# Patient Record
Sex: FEMALE | Race: WHITE | NOT HISPANIC OR LATINO | Employment: FULL TIME | URBAN - METROPOLITAN AREA
[De-identification: names, ages, dates, MRNs, and addresses within clinical notes are randomized per-mention and may not be internally consistent; named-entity substitution may affect disease eponyms.]

---

## 2017-03-20 ENCOUNTER — OFFICE VISIT - HEALTHEAST (OUTPATIENT)
Dept: FAMILY MEDICINE | Facility: CLINIC | Age: 27
End: 2017-03-20

## 2017-03-20 DIAGNOSIS — Z20.818 STREP THROAT EXPOSURE: ICD-10-CM

## 2017-03-20 DIAGNOSIS — Z20.828 EXPOSURE TO INFLUENZA: ICD-10-CM

## 2017-03-20 DIAGNOSIS — R68.89 INFLUENZA-LIKE SYMPTOMS: ICD-10-CM

## 2017-03-20 DIAGNOSIS — H61.22 LEFT EAR IMPACTED CERUMEN: ICD-10-CM

## 2017-03-20 DIAGNOSIS — M79.10 MYALGIA: ICD-10-CM

## 2017-03-20 RX ORDER — VALACYCLOVIR HYDROCHLORIDE 500 MG/1
TABLET, FILM COATED ORAL
Refills: 2 | Status: SHIPPED | COMMUNITY
Start: 2017-03-08

## 2017-05-02 ENCOUNTER — HOSPITAL ENCOUNTER (OUTPATIENT)
Dept: OBGYN | Facility: HOSPITAL | Age: 27
Discharge: HOME | End: 2017-05-02
Attending: OBSTETRICS & GYNECOLOGY | Admitting: OBSTETRICS & GYNECOLOGY

## 2017-05-02 ASSESSMENT — MIFFLIN-ST. JEOR: SCORE: 1457.04

## 2017-06-02 ENCOUNTER — COMMUNICATION - HEALTHEAST (OUTPATIENT)
Dept: ADMINISTRATIVE | Facility: OTHER | Age: 27
End: 2017-06-02

## 2017-06-16 ENCOUNTER — HOSPITAL ENCOUNTER (OUTPATIENT)
Dept: MEDSURG UNIT | Facility: CLINIC | Age: 27
Discharge: HOME | End: 2017-06-16
Attending: OBSTETRICS & GYNECOLOGY | Admitting: OBSTETRICS & GYNECOLOGY

## 2017-06-16 ENCOUNTER — HOSPITAL ENCOUNTER (OUTPATIENT)
Dept: OBGYN | Facility: CLINIC | Age: 27
Discharge: HOME | End: 2017-06-17
Attending: OBSTETRICS & GYNECOLOGY | Admitting: OBSTETRICS & GYNECOLOGY

## 2017-06-16 ASSESSMENT — MIFFLIN-ST. JEOR: SCORE: 1457.04

## 2017-06-29 ENCOUNTER — HOME CARE/HOSPICE - HEALTHEAST (OUTPATIENT)
Dept: HOME HEALTH SERVICES | Facility: HOME HEALTH | Age: 27
End: 2017-06-29

## 2017-07-02 ENCOUNTER — HOME CARE/HOSPICE - HEALTHEAST (OUTPATIENT)
Dept: HOME HEALTH SERVICES | Facility: HOME HEALTH | Age: 27
End: 2017-07-02

## 2017-07-31 ENCOUNTER — OFFICE VISIT - HEALTHEAST (OUTPATIENT)
Dept: FAMILY MEDICINE | Facility: CLINIC | Age: 27
End: 2017-07-31

## 2017-07-31 DIAGNOSIS — T63.301A SPIDER BITE: ICD-10-CM

## 2017-07-31 ASSESSMENT — MIFFLIN-ST. JEOR: SCORE: 1373.13

## 2017-10-03 ENCOUNTER — OFFICE VISIT - HEALTHEAST (OUTPATIENT)
Dept: FAMILY MEDICINE | Facility: CLINIC | Age: 27
End: 2017-10-03

## 2017-10-03 DIAGNOSIS — J01.90 ACUTE SINUSITIS: ICD-10-CM

## 2018-01-31 ENCOUNTER — COMMUNICATION - HEALTHEAST (OUTPATIENT)
Dept: SCHEDULING | Facility: CLINIC | Age: 28
End: 2018-01-31

## 2018-02-01 ENCOUNTER — OFFICE VISIT - HEALTHEAST (OUTPATIENT)
Dept: FAMILY MEDICINE | Facility: CLINIC | Age: 28
End: 2018-02-01

## 2018-02-01 DIAGNOSIS — H61.22 IMPACTED CERUMEN OF LEFT EAR: ICD-10-CM

## 2018-02-01 DIAGNOSIS — R68.89 FLU-LIKE SYMPTOMS: ICD-10-CM

## 2018-02-01 DIAGNOSIS — H66.90 OTITIS MEDIA: ICD-10-CM

## 2018-02-01 LAB
DEPRECATED S PYO AG THROAT QL EIA: NORMAL
FLUAV AG SPEC QL IA: NORMAL
FLUBV AG SPEC QL IA: NORMAL

## 2018-02-01 ASSESSMENT — MIFFLIN-ST. JEOR: SCORE: 1393.54

## 2018-02-02 ENCOUNTER — COMMUNICATION - HEALTHEAST (OUTPATIENT)
Dept: FAMILY MEDICINE | Facility: CLINIC | Age: 28
End: 2018-02-02

## 2018-02-02 LAB — GROUP A STREP BY PCR: NORMAL

## 2018-02-07 ENCOUNTER — OFFICE VISIT - HEALTHEAST (OUTPATIENT)
Dept: CARDIOLOGY | Facility: CLINIC | Age: 28
End: 2018-02-07

## 2018-02-07 DIAGNOSIS — J90 PLEURAL EFFUSION, BILATERAL: ICD-10-CM

## 2018-02-07 DIAGNOSIS — R06.00 DYSPNEA: ICD-10-CM

## 2018-02-07 ASSESSMENT — MIFFLIN-ST. JEOR: SCORE: 1384.47

## 2018-02-08 ENCOUNTER — OFFICE VISIT - HEALTHEAST (OUTPATIENT)
Dept: FAMILY MEDICINE | Facility: CLINIC | Age: 28
End: 2018-02-08

## 2018-02-08 DIAGNOSIS — J98.4 PNEUMONITIS: ICD-10-CM

## 2018-02-08 ASSESSMENT — MIFFLIN-ST. JEOR: SCORE: 1379.93

## 2018-02-15 ENCOUNTER — HOSPITAL ENCOUNTER (OUTPATIENT)
Dept: CARDIOLOGY | Facility: CLINIC | Age: 28
Discharge: HOME | End: 2018-02-15
Attending: INTERNAL MEDICINE

## 2018-02-15 DIAGNOSIS — J90 PLEURAL EFFUSION, BILATERAL: ICD-10-CM

## 2018-02-15 DIAGNOSIS — R06.00 DYSPNEA: ICD-10-CM

## 2018-02-15 LAB
AORTIC ROOT: 2.4 CM
BSA FOR ECHO PROCEDURE: 1.77 M2
CV BLOOD PRESSURE: NORMAL MMHG
CV ECHO HEIGHT: 62 IN
CV ECHO WEIGHT: 158 LBS
DOP CALC LVOT AREA: 2.83 CM2
DOP CALC LVOT DIAMETER: 1.9 CM
DOP CALC LVOT PEAK VEL: 70.7 CM/S
DOP CALC LVOT STROKE VOLUME: 48.5 CM3
DOP CALCLVOT PEAK VEL VTI: 17.1 CM
EJECTION FRACTION: 59 % (ref 55–75)
FRACTIONAL SHORTENING: 29.4 % (ref 28–44)
INTERVENTRICULAR SEPTUM IN END DIASTOLE: 0.9 CM (ref 0.6–0.9)
IVS/PW RATIO: 1
LA AREA 1: 10.9 CM2
LA AREA 2: 8.89 CM2
LEFT ATRIUM LENGTH: 3.77 CM
LEFT ATRIUM SIZE: 2.9 CM
LEFT ATRIUM TO AORTIC ROOT RATIO: 1.21 NO UNITS
LEFT ATRIUM VOLUME INDEX: 12.3 ML/M2
LEFT ATRIUM VOLUME: 21.8 ML
LEFT VENTRICLE DIASTOLIC VOLUME INDEX: 36.2 CM3/M2 (ref 34–74)
LEFT VENTRICLE DIASTOLIC VOLUME: 64 CM3 (ref 46–106)
LEFT VENTRICLE MASS INDEX: 58.1 G/M2
LEFT VENTRICLE SYSTOLIC VOLUME INDEX: 14.7 CM3/M2 (ref 11–31)
LEFT VENTRICLE SYSTOLIC VOLUME: 26 CM3 (ref 14–42)
LEFT VENTRICULAR INTERNAL DIMENSION IN DIASTOLE: 3.84 CM (ref 3.8–5.2)
LEFT VENTRICULAR INTERNAL DIMENSION IN SYSTOLE: 2.71 CM (ref 2.2–3.5)
LEFT VENTRICULAR MASS: 102.8 G
LEFT VENTRICULAR OUTFLOW TRACT MEAN GRADIENT: 1 MMHG
LEFT VENTRICULAR OUTFLOW TRACT MEAN VELOCITY: 51.1 CM/S
LEFT VENTRICULAR OUTFLOW TRACT PEAK GRADIENT: 2 MMHG
LEFT VENTRICULAR POSTERIOR WALL IN END DIASTOLE: 0.9 CM (ref 0.6–0.9)
LV STROKE VOLUME INDEX: 27.4 ML/M2
MITRAL VALVE E/A RATIO: 1.6
MV DECELERATION TIME: 296 MS
MV E'TISSUE VEL-LAT: 22.6 CM/S
MV LATERAL E/E' RATIO: 3.9
MV PEAK A VELOCITY: 53.8 CM/S
MV PEAK E VELOCITY: 87.9 CM/S
NUC REST DIASTOLIC VOLUME INDEX: 2528 LBS
NUC REST SYSTOLIC VOLUME INDEX: 62 IN
TRICUSPID REGURGITATION PEAK PRESSURE GRADIENT: 20.6 MMHG
TRICUSPID VALVE ANULAR PLANE SYSTOLIC EXCURSION: 2.1 CM
TRICUSPID VALVE PEAK REGURGITANT VELOCITY: 227 CM/S

## 2018-02-15 ASSESSMENT — MIFFLIN-ST. JEOR: SCORE: 1379.93

## 2018-07-03 LAB
ANTIBODY_EXT (HISTORICAL CONVERSION): NEGATIVE
HBSAG_EXT (HISTORICAL CONVERSION): NORMAL
HGB_EXT (HISTORICAL CONVERSION): 13.7
HIV_EXT: NORMAL
PLT_EXT - HISTORICAL: 304
RPR - HISTORICAL: NORMAL
RUBELLA_EXT (HISTORICAL CONVERSION): NORMAL

## 2018-10-25 ENCOUNTER — COMMUNICATION - HEALTHEAST (OUTPATIENT)
Dept: TELEHEALTH | Facility: CLINIC | Age: 28
End: 2018-10-25

## 2018-10-25 ENCOUNTER — COMMUNICATION - HEALTHEAST (OUTPATIENT)
Dept: HEALTH INFORMATION MANAGEMENT | Facility: CLINIC | Age: 28
End: 2018-10-25

## 2019-01-02 LAB — GBS_EXT (HISTORICAL CONVERSION): NEGATIVE

## 2019-01-17 ENCOUNTER — HOSPITAL ENCOUNTER (OUTPATIENT)
Dept: OBGYN | Facility: CLINIC | Age: 29
Discharge: HOME | End: 2019-01-17
Attending: OBSTETRICS & GYNECOLOGY | Admitting: OBSTETRICS & GYNECOLOGY

## 2019-01-17 LAB — T PALLIDUM AB SER QL: NEGATIVE

## 2019-01-17 ASSESSMENT — MIFFLIN-ST. JEOR: SCORE: 1529.62

## 2019-01-19 ENCOUNTER — HOSPITAL ENCOUNTER (OUTPATIENT)
Dept: MEDSURG UNIT | Facility: CLINIC | Age: 29
Discharge: HOME | End: 2019-01-19
Attending: OBSTETRICS & GYNECOLOGY | Admitting: OBSTETRICS & GYNECOLOGY

## 2019-01-19 ASSESSMENT — MIFFLIN-ST. JEOR: SCORE: 1529.62

## 2019-01-21 ENCOUNTER — HOME CARE/HOSPICE - HEALTHEAST (OUTPATIENT)
Dept: HOME HEALTH SERVICES | Facility: HOME HEALTH | Age: 29
End: 2019-01-21

## 2019-10-17 ENCOUNTER — OFFICE VISIT - HEALTHEAST (OUTPATIENT)
Dept: FAMILY MEDICINE | Facility: CLINIC | Age: 29
End: 2019-10-17

## 2019-10-17 DIAGNOSIS — D22.9 MULTIPLE ATYPICAL SKIN MOLES: ICD-10-CM

## 2019-10-17 DIAGNOSIS — M54.2 NECK PAIN: ICD-10-CM

## 2019-10-17 ASSESSMENT — MIFFLIN-ST. JEOR: SCORE: 1428.42

## 2019-10-17 ASSESSMENT — PATIENT HEALTH QUESTIONNAIRE - PHQ9: SUM OF ALL RESPONSES TO PHQ QUESTIONS 1-9: 0

## 2021-05-26 ENCOUNTER — RECORDS - HEALTHEAST (OUTPATIENT)
Dept: ADMINISTRATIVE | Facility: CLINIC | Age: 31
End: 2021-05-26

## 2021-05-26 ASSESSMENT — PATIENT HEALTH QUESTIONNAIRE - PHQ9: SUM OF ALL RESPONSES TO PHQ QUESTIONS 1-9: 0

## 2021-05-27 ENCOUNTER — RECORDS - HEALTHEAST (OUTPATIENT)
Dept: ADMINISTRATIVE | Facility: CLINIC | Age: 31
End: 2021-05-27

## 2021-05-29 ENCOUNTER — RECORDS - HEALTHEAST (OUTPATIENT)
Dept: ADMINISTRATIVE | Facility: CLINIC | Age: 31
End: 2021-05-29

## 2021-05-30 VITALS — BODY MASS INDEX: 32.2 KG/M2 | WEIGHT: 175 LBS | HEIGHT: 62 IN

## 2021-05-30 VITALS — BODY MASS INDEX: 30.42 KG/M2 | WEIGHT: 166.3 LBS

## 2021-05-31 ENCOUNTER — RECORDS - HEALTHEAST (OUTPATIENT)
Dept: ADMINISTRATIVE | Facility: CLINIC | Age: 31
End: 2021-05-31

## 2021-05-31 VITALS — BODY MASS INDEX: 32.2 KG/M2 | HEIGHT: 62 IN | WEIGHT: 175 LBS

## 2021-05-31 VITALS — WEIGHT: 156.5 LBS | BODY MASS INDEX: 28.8 KG/M2 | HEIGHT: 62 IN

## 2021-05-31 VITALS — HEIGHT: 62 IN | WEIGHT: 161 LBS | BODY MASS INDEX: 29.63 KG/M2

## 2021-06-01 ENCOUNTER — RECORDS - HEALTHEAST (OUTPATIENT)
Dept: ADMINISTRATIVE | Facility: CLINIC | Age: 31
End: 2021-06-01

## 2021-06-01 VITALS — WEIGHT: 158 LBS | BODY MASS INDEX: 29.08 KG/M2 | HEIGHT: 62 IN

## 2021-06-01 VITALS — WEIGHT: 158 LBS | HEIGHT: 62 IN | BODY MASS INDEX: 29.08 KG/M2

## 2021-06-01 VITALS — BODY MASS INDEX: 29.26 KG/M2 | WEIGHT: 159 LBS | HEIGHT: 62 IN

## 2021-06-02 ENCOUNTER — RECORDS - HEALTHEAST (OUTPATIENT)
Dept: ADMINISTRATIVE | Facility: CLINIC | Age: 31
End: 2021-06-02

## 2021-06-02 VITALS — HEIGHT: 62 IN | WEIGHT: 191 LBS | BODY MASS INDEX: 35.15 KG/M2

## 2021-06-02 VITALS — BODY MASS INDEX: 35.15 KG/M2 | WEIGHT: 191 LBS | HEIGHT: 62 IN

## 2021-06-02 NOTE — PROGRESS NOTES
OV   10/17/2019  Assessment & Plan:         1. Neck pain  cyclobenzaprine (FLEXERIL) 10 MG tablet   2. Multiple atypical skin moles           We reviewed the potential etiologies for her neck pain, and options for treatment. I will send a new Rx for cyclobenzaprine to be taken at bedtime as needed, and she can take otc tylenol or advil as needed for comfort. We discussed activity as directed and use of heat and/or ice for comfort. She will call or return to clinic with any ongoing or worsening symptoms. We reviewed indications for re-evaluation and she will call or return to clinic with any additional problems or concerns. I reassured her that the moles appear benign and we reviewed indications for re-evaluation. We discussed referral to dermatology for a full body skin check and she will call if she would like to proceed.       Subjective:               Silva Guerrero is a 29 y.o. female who presents for evaluation of neck pain. Event that precipitated these symptoms: none known. Onset of symptoms was several months ago, and have been intermittent since that time. Curren tsymptoms are pain in posterior neck (aching and burning in character; moderate/10 in severity) and stiffness in mornings. Patient denies paresthesias and weakness in arms/hands. Patient has had no prior neck problems. Previous treatments: none.         She also reports moles that she is concerned about. One on her back keeps crusting over. She denies itching or bleeding and has not noted any significant changes in any of the lesions.     The following portions of the patient's history were reviewed and updated as appropriate: allergies, current medications, past family history, past medical history, past social history, past surgical history and problem list.    Review of Systems  A 12 point comprehensive review of systems was negative except as noted.      Objective:      /84 (Patient Position: Sitting, Cuff Size: Adult Regular)   Pulse  "(!) 117   Ht 5' 3\" (1.6 m)   Wt 165 lb 3 oz (74.9 kg)   SpO2 98%   BMI 29.26 kg/m    GEN: Alert and oriented, NAD, well nourished  SKIN:  Normal skin turgor. Scattered waxy lesions on back and trunk.   HEENT: NC/AT, moist mucous membranes, no rhinorrhea.    NECK: Normal.  No adenopathy or thyromegaly.  CV: Regular rate and rhythm, no murmurs.   LUNGS: Clear to auscultation bilaterally.    ABDOMEN: Soft, non-tender, non-distended, no masses   BACK: Normal  EXTREMITY: No edema, cyanosis  NEURO: Grossly normal. Normal strength and   "

## 2021-06-03 VITALS
HEIGHT: 63 IN | BODY MASS INDEX: 29.27 KG/M2 | OXYGEN SATURATION: 98 % | WEIGHT: 165.19 LBS | DIASTOLIC BLOOD PRESSURE: 84 MMHG | SYSTOLIC BLOOD PRESSURE: 122 MMHG | HEART RATE: 117 BPM

## 2021-06-11 NOTE — PROGRESS NOTES
Dr Sebastian updated by phone of pt status and unchanged cervex.  D/C orders received.  Reviewed with pt stretching and positions for encouraging baby to align better for labor and d/c instructions reviewed.  Pt to home ambulatory.

## 2021-06-11 NOTE — PROGRESS NOTES
Notified Dr. Sebastian that pt slept after morphine and vistaril, SVE unchanged, will d/c pt home after reactive NST. Reviewed labor precautions and any day now brochure.

## 2021-06-11 NOTE — PROGRESS NOTES
Pt here with hx of nuno all night pt more painful depending on position.  Was 2m cm in clinic this week.  Denies LOF.

## 2021-06-12 NOTE — PROGRESS NOTES
PROGRESS NOTE       SUBJECTIVE:  Silva Guerrero is a 26 y.o. female   Chief Complaint   Patient presents with     Insect Bite     ? spider bite - (L) breast - is breast feeding     Patient has an insect bite on her left breast.  It happened 2 days ago and the redness surrounding it appears to be increasing.  It is also tender to touch but there is been no drainage.  Initially it was itchy.  She did not see the insect that bit her.  She is currently breast-feeding her 1-month-old son.    Patient Active Problem List   Diagnosis     Herpes Simplex Type II     Congenital Unilateral Dislocation Of Hip     Abnormal Weight Gain     Torticollis     Depression With Anxiety     Amenorrhea     Neck Pain     Spontaneous      Recurrent Pregnancy Loss (Non-gravid)     Irregular Length Of Menstrual Periods     Vaginal delivery       Current Outpatient Prescriptions   Medication Sig Dispense Refill     PRENATAL VIT W-CA,FE,FA,<1 MG, (PRENATAL VITAMIN ORAL) Take by mouth.       valACYclovir (VALTREX) 500 MG tablet   2     cephalexin (KEFLEX) 500 MG capsule Take 1 capsule (500 mg total) by mouth 3 (three) times a day for 7 days. 21 capsule 0     docusate sodium (COLACE) 100 MG capsule Take 1 capsule (100 mg total) by mouth 2 (two) times a day. 60 capsule 0     FOLIC ACID ORAL Take by mouth.       No current facility-administered medications for this visit.        History   Smoking Status     Former Smoker     Packs/day: 0.25     Quit date: 2015   Smokeless Tobacco     Never Used       OBJECTIVE:       Vitals:    17 1633   BP: 92/64   Pulse: 64     Weight: 156 lb 8 oz (71 kg)  Wt Readings from Last 3 Encounters:   17 156 lb 8 oz (71 kg)   17 184 lb (83.5 kg)   17 175 lb (79.4 kg)     Body mass index is 28.62 kg/(m^2).        Physical Exam:  GENERAL APPEARANCE: A&A, NAD, well hydrated, well nourished  SKIN: Patient has an area of induration 1 cm in diameter that is erythematous in the upper outer  portion of the  left breast.  There is surrounding erythema that measures 4 cm in diameter.  There is no open drainage.  PSYCHIATRIC:  Mood appropriate, memory intact        ASSESSMENT/PLAN:     1. Spider bite  Since the area of redness has increased and there is tenderness to palpation, I recommended treating with cephalexin and warm packs.  Patient agrees with the plan and I explained that this is a medication that we use commonly for mastitis.  She should recheck if fails to improve.  - cephalexin (KEFLEX) 500 MG capsule; Take 1 capsule (500 mg total) by mouth 3 (three) times a day for 7 days.  Dispense: 21 capsule; Refill: 0    The visit lasted a total of 15 minutes face to face with the patient.  Over 50% of the time spent counseling and educating the patient about all of the above.      Zahraa Baugh MD

## 2021-06-13 NOTE — PROGRESS NOTES
Assessment:   The encounter diagnosis was Acute sinusitis.     Plan:   No medications were ordered this encounter    Patient Instructions     The symptoms you describe suggest a viral cause, which is much more common than a bacterial cause. We know that most sinus infections are viral and it will likely clear within about 7-10 days. If it is not improving at that time, it is more likely bacterial; we do not have a test to clearly tell the difference. Antibiotics will treat bacterial infections, but have no effect on viral infections. Bacterial infections generally are more severe, including symptoms such as pus, fever over 101degrees F, or rapidly worsening.    Push fluids, get extra rest  Recommend Tylenol or Ibuprofen for headaches  Hot steamy showers or warm packs over the sinuses to lessen the pressure and congestion  May take an antihistamine such as claritin, zyrtec or allegra if your nose is quite runny  Follow up in clinic if symptoms are not improving as expected or if worsening in any way.         Return for further follow up if needed. Call [phone]) or [facility]    Subjective:   Silva Guerrero is a 27 y.o. female who submitted an eVisit request for evaluation of her Sinus Problem.  See the questionnaire and message section of encounter report for information related to history of present illness and review of systems.    The following portions of the patient's history were reviewed and updated as appropriate:  She  does not have any pertinent problems on file.  She is allergic to latex..     Objective:   No exam performed today, patient submitted as eVisit.

## 2021-06-15 NOTE — PROGRESS NOTES
1. Flu-like symptoms  Rapid Strep A Screen-Throat    Influenza A/B Rapid Test    Group A Strep, RNA Direct Detection, Throat   2. Impacted cerumen of left ear  Ear Cerumen Removal-Clinic   3. Otitis media  amoxicillin (AMOXIL) 875 MG tablet     Med list reconciled    ASSESSMENT/PLAN:     The following high BMI interventions were performed this visit: encouragement to exercise and weight monitoring    1. Flu-like symptoms    - Rapid Strep A Screen-Throat  - Influenza A/B Rapid Test    2. Impacted cerumen of left ear    - Ear Cerumen Removal-Clinic    3.  Acute otitis media, left ear    -Amoxicillin 875 mg twice daily for 10 days    Return to clinic if symptoms persist or become severe despite antibiotic therapy.       The visit lasted a total of  minutes face to face with the patient.  Over 50% of the time spent counseling and educating the patient about .      Laurita Parikh NP          SUBJECTIVE:  Silva Guerrero is a 27 y.o. female who presents with 2 weeks of sore throat, chest congestion, back pain, runny nose and mild shortness of breath that is now constant.  She describes her generalized discomfort as an achy sensation.  Aggravating factors: Bending over and being active.  Relieving factors: She is very limited on over-the-counter use of medications due to nursing her .  She has tried some elderberry for her symptoms.  She is rating her throat and chest congestion discomfort a 6 out of 10 today.  She has been exposed to illness as her 7-year-old has recently had a cough and upper respiratory symptoms.  She has not traveled recently, she has no history of asthma and she does not smoke cigarettes.  Did not receive her influenza vaccination this year.  Today, her vital signs are stable, she has no fever.  Ear wash performed in clinic today due to left cerumen impaction.  Rapid strep and influenza testing performed.   Chief Complaint   Patient presents with     Illness     cough, chest tightness,  irritated throat, HA         Patient Active Problem List   Diagnosis     Herpes Simplex Type II     Congenital Unilateral Dislocation Of Hip     Abnormal Weight Gain     Torticollis     Depression With Anxiety     Amenorrhea     Neck Pain     Spontaneous      Recurrent Pregnancy Loss (Non-gravid)     Irregular Length Of Menstrual Periods     Vaginal delivery       Current Outpatient Prescriptions   Medication Sig Dispense Refill     FOLIC ACID ORAL Take by mouth.       PRENATAL VIT W-CA,FE,FA,<1 MG, (PRENATAL VITAMIN ORAL) Take by mouth.       valACYclovir (VALTREX) 500 MG tablet   2     amoxicillin (AMOXIL) 875 MG tablet Take 1 tablet (875 mg total) by mouth 2 (two) times a day for 10 days. 20 tablet 0     No current facility-administered medications for this visit.        History   Smoking Status     Former Smoker     Packs/day: 0.25     Quit date: 2015   Smokeless Tobacco     Never Used       REVIEW OF SYSTEMS: Denies swollen glands/abdominal pain/changes in bowel habits/urinary symptoms/rash.      TOBACCO USE:  History   Smoking Status     Former Smoker     Packs/day: 0.25     Quit date: 2015   Smokeless Tobacco     Never Used     Social History     Social History     Marital status:      Spouse name: N/A     Number of children: 2     Years of education: N/A     Occupational History     [employer]       Social History Main Topics     Smoking status: Former Smoker     Packs/day: 0.25     Quit date: 2015     Smokeless tobacco: Never Used     Alcohol use Yes      Comment: Rare social use; none with pregnancy     Drug use: No     Sexual activity: Yes     Partners: Male     Birth control/ protection: None     Other Topics Concern     Not on file     Social History Narrative    Daughter Rema (06), son Leonid Moore (10/7/10) .         OBJECTIVE:            Vitals:    18 1450   BP: 110/70   Pulse: 86   Resp: 16   Temp: 98.2  F (36.8  C)   SpO2: 98%     Weight: 161 lb (73  kg)  Wt Readings from Last 3 Encounters:   02/01/18 161 lb (73 kg)   07/31/17 156 lb 8 oz (71 kg)   06/27/17 184 lb (83.5 kg)     Body mass index is 29.45 kg/(m^2).        Physical Exam:  GENERAL APPEARANCE: A&A, NAD, well hydrated, well nourished  HEAD: atraumatic, no deformity, denies sinus pain  EYES: PERRL, EOM's intact, no redness or swelling  EARS: Right TM normal, gray with nl light reflex, Left TM unable to visualize due to cerumen impaction, left TM with moderate erythema and moderate bulging, no perforation or fluid noted.  NOSE: Thick, cloudy nasal secretions bilateral nares  MOUTH: without erythema, exudate or thrush  NECK: Supple, without lymphadenopathy, no thyroid mass, no JVD, no bruit  CV: RRR, no M/G/R   LUNGS: CTAB, normal respiratory effort  ABDOMEN: S&NT, no masses, no organomegaly, BS present x4   SKIN:  Normal skin turgor, no lesions/rashes, warm and dry

## 2021-06-15 NOTE — PROGRESS NOTES
PROGRESS NOTE       SUBJECTIVE:  Silva Guerrero is a 27 y.o. female   Chief Complaint   Patient presents with     Follow-up     ER WW , sob , chest back, pain, pt was dx with lung infection and pneumona, echo next week      Silva is here in follow-up from her emergency room visit 2018.  3 weeks ago, her son stanley had a virus and pinkeye.  Her 7-month-old also had some respiratory symptoms but was better in 5 days.  She had something back then as well.  Then last Monday she became ill again.  She felt swollen glands in her neck and was prescribed amoxicillin.  She was feeling better then went to urgent care because of stomach pain.  This was better on Monday, .  Then yesterday she felt ill again with the same symptoms and her throat felt as if it was choking her.  She had to think about breathing and she was quite uncomfortable.  A complete workup was done including a CT of the chest to rule out PE.  There is no PE but she did have small bilateral pleural effusions.  Her white count was 9.2 with prominent neutrophils.  Patient was placed on 875 Augmentin twice daily.  She now has diarrhea.  She has had 3 doses.  Her breathing is feeling much better and she does have follow-up with cardiology scheduled to be sure there is no myocarditis.  She got a good night sleep last night and so that helped as well.  Patient Active Problem List   Diagnosis     Herpes Simplex Type II     Congenital Unilateral Dislocation Of Hip     Abnormal Weight Gain     Torticollis     Depression With Anxiety     Amenorrhea     Neck Pain     Spontaneous      Recurrent Pregnancy Loss (Non-gravid)     Irregular Length Of Menstrual Periods     Vaginal delivery       Current Outpatient Prescriptions   Medication Sig Dispense Refill     amoxicillin-clavulanate (AUGMENTIN XR) 1,000-62.5 mg per tablet Take 2 tablets by mouth 2 (two) times a day for 7 days. 28 tablet 0     PRENATAL VIT W-CA,FE,FA,<1 MG, (PRENATAL VITAMIN  ORAL) Take by mouth.       amoxicillin (AMOXIL) 875 MG tablet Take 1 tablet (875 mg total) by mouth 2 (two) times a day for 10 days. 20 tablet 0     escitalopram oxalate (LEXAPRO) 10 MG tablet   0     omeprazole (PRILOSEC) 20 MG capsule Take 20 mg by mouth.       ondansetron (ZOFRAN-ODT) 4 MG disintegrating tablet Take 4 mg by mouth.       valACYclovir (VALTREX) 500 MG tablet   2     No current facility-administered medications for this visit.        History   Smoking Status     Former Smoker     Packs/day: 0.25     Quit date: 12/1/2015   Smokeless Tobacco     Never Used       REVIEW OF SYSTEMS:  Patient denies fever, chills, dizziness, headache, visual change, cough, chest pain, shortness of breath, abdominal pain, extremity pain or swelling.  She is feeling much improved.        OBJECTIVE:       Vitals:    02/08/18 1312   BP: 110/68   Pulse: 82   Temp: 98.3  F (36.8  C)   SpO2: 99%     Weight: 158 lb (71.7 kg)  Wt Readings from Last 3 Encounters:   02/08/18 158 lb (71.7 kg)   02/07/18 159 lb (72.1 kg)   02/07/18 158 lb (71.7 kg)     Body mass index is 28.9 kg/(m^2).        Physical Exam:  GENERAL APPEARANCE: A&A, NAD, well hydrated, well nourished  SKIN:  Normal skin turgor, no lesions/rashes   EARS: TM's normal, gray with nl light reflex  OROPHARYNX: without erythema, no post nasal drainage or thrush  NECK: Supple, without lymphadenopathy, no thyroid mass  CV: RRR, no M/G/R   LUNGS: CTAB, normal respiratory effort  EXTREMITY: Extremities normal, atraumatic, no swelling  NEURO: no gross deficits   PSYCHIATRIC:  Mood appropriate, memory intact        ASSESSMENT/PLAN:   1. Pneumonitis    Clinically consistent with an early pneumonia.  She had a nice response with Augmentin which is now giving her loose stools.  I recommended she cut the tablets in half and take 1 3:30 times daily until the Augmentin is gone.  She should follow-up with cardiology as planned.  I recommended that in the next 10-14 days she should bring  her 7-month-old and and herself to the clinic for influenza vaccine.    There are no Patient Instructions on file for this visit.  Medications Discontinued During This Encounter   Medication Reason     FOLIC ACID ORAL Therapy completed         The visit lasted a total of 25 minutes face to face with the patient.  Over 50% of the time spent counseling and educating the patient about all of the above.      Zahraa Baugh MD

## 2021-06-16 PROBLEM — Z34.90 PREGNANT: Status: ACTIVE | Noted: 2019-01-17

## 2021-06-23 NOTE — PROGRESS NOTES
Patient has went over 10 hours without cervical change.  Informed Dr. Sebastian who gave order for patient to discharge home.  Reviewed symptoms of early labor and difference between early and active labor with patient.  Patient verbalized understanding of instructions.

## 2021-06-23 NOTE — PLAN OF CARE
Patient is here for contractions.  It is her 4th baby.  She has been nuno since yesterday.  And today noticed that her contractions were more strong but lasting about 2 minutes in length.  Checked her and she is still 3cm from 2 days ago.  Going to have her walk for about an hour and then recheck patient and call Dr. Kaiser with updates.  Patient is good with plan

## 2021-06-23 NOTE — PROGRESS NOTES
As patient was leaving for discharge the patient told writer that she was feeling hot, nausea, and had a headache.  BP normal.  Reflexes diminished to bilateral lower extremities.  Negative clonus.  Writer called Dr. Sebastian to notify of above.  Dr. Sebastian said patient  could still discharge home, explained these symptoms have been on going in the patient off and on over the last month.

## 2021-06-23 NOTE — PROGRESS NOTES
Pt reports that her contractions are becoming more uncomfortable. She is coping well, changing positions and activity often. FM+. Denies any vaginal bleeding or fluid leaking.

## 2021-06-23 NOTE — H&P
OB Admission H&P     Date: 2019  Time: 5:29 AM   Silva Guerrero, : 1990, MRN: [id number]     CC: Labor    HPI: Silva Guerrero is a 28 y.o.  with  single inter-uterine gestation at 38w2d, with ANAYELI of Estimated Date of Delivery: 19. She presented to L&D with contractions .  Pregnancy has been complicated by History of HSV but has been cleared to deliver vaginally since no lesions seen by Romulo yesterday in clinic. She also denies any new lesions. . Currently on Valtrex prophylaxis. Patient denies headache, visual changes, RUQ pain. She denies contractions, leakage of fluid, or vaginal bleeding and reports good fetal movement.     OB History   OB History    Para Term  AB Living   12 3 3 0 8 3   SAB TAB Ectopic Multiple Live Births   7 1 0 0 3      # Outcome Date GA Lbr Dhiraj/2nd Weight Sex Delivery Anes PTL Lv   12 Current            11 Term 17 39w5d 01:27 / 00:05 8 lb 6 oz (3.799 kg) M Vag-Spont None N ELODAN   10 Term 10/07/10    M Vag-Spont   LEODAN   9 Term 06    F Vag-Spont   LEODAN   8 SAB            7 SAB            6 SAB            5 SAB            4 SAB            3 SAB            2 SAB            1 TAB                   Past Medical History:  Past Medical History:   Diagnosis Date     Depression with anxiety      Dislocation of hip (H) 11/15/2007     Foot Sprain      Pregnancy     Approximately  - 2 's, 1 EAB, 5 SAB's     Spontaneous      x 5        Past Surgical History:   Past Surgical History:   Procedure Laterality Date     AUGMENTATION MAMMAPLASTY Bilateral      DILATION AND CURETTAGE OF UTERUS  2016     HIP SURGERY Left 2007    Reconstruction (congenital hip)     HIP SURGERY Left 2011    Removal of hardware from previous surgery     HIP SURGERY Left Age 1 or 1    For congenital hip        Social History   Reviewed, patient denies smoking, alcohol and drug use      Medications  No current facility-administered  "medications on file prior to encounter.      Current Outpatient Medications on File Prior to Encounter   Medication Sig Dispense Refill     ranitidine (ZANTAC) 150 MG tablet Take 150 mg by mouth 2 (two) times a day as needed for heartburn.       PRENATAL VIT W-CA,FE,FA,<1 MG, (PRENATAL VITAMIN ORAL) Take by mouth.       valACYclovir (VALTREX) 500 MG tablet   2     [DISCONTINUED] escitalopram oxalate (LEXAPRO) 10 MG tablet   0     [DISCONTINUED] ondansetron (ZOFRAN-ODT) 4 MG disintegrating tablet Take 4 mg by mouth.         Allergies   Allergies   Allergen Reactions     Latex        ROS: otherwise negative except what is stated in HPI.     Physical Exam:   Vitals pending - /57 (Patient Position: Lying, Cuff Size: Adult Regular)   Pulse 100   Temp 97.9  F (36.6  C) (Oral)   Resp 18   Ht 5' 2\" (1.575 m)   Wt 191 lb (86.6 kg)   LMP 04/18/2018   BMI 34.93 kg/m     Gen: no acute distress, resting comfortably   CV: acyanotic   Heart: regular rate and rhythm   Pulm: unlabored respirations, clear to ausculation bilaterally    Abd: gravid, soft, nontender   Extremities: soft, nontender   FHR: positive, catagory 1  [place]: regular contractions  Cerivx changed from 3 to 4cm.    Pertinent Labs   Prenatal Labs  Result Component Result Previous Result   ABO/Rh External Result O Positive (11/10/2016) No Results   ABORh O POS (6/16/2018) No Results   Hemoglobin External Result 13.7 (7/3/2018) 11.1 (4/4/2017)   Rubella External Result Immune (7/3/2018) Immune (11/10/2016)        Impression:   single inter uterine pregnancy at term   Pregnancy complications include: HSV but currently no lesions  Labor     Plan:   Spontaneous Labor, anticipate Vaginal Delivery       Jeremy Jama MD   [facility]  [phone]     "

## 2021-06-23 NOTE — PLAN OF CARE
Updated Dr. Kaiser that patient is here for contractions and after checking her an hour after her first check she did not make any change.  We are good to send home and patient is good with plan.  Went over what to watch for at home to know when to come back.  Patient is on board

## 2021-06-23 NOTE — DISCHARGE SUMMARY
ANTEPARTUM DISCHARGE SUMMARY    Patient Name: Silva Guerrero   YOB: 1990   Medical Record Number: [id number]     Primary Physician: Zahraa Baugh MD     Admission Date: 2019   Discharge date: 2019    Gestational age at discharge: 38w2d    Reason for admission:   contractions     Diagnosis:   contractions , not in labor    Procedures completed while in hospital:  NST    Diagnostic imaging:  No results found.    Consults:  None    HOSPITAL COURSE:   Silva Guerrero is a 28 y.o.  female,  whose Estimated Date of Delivery: 19.     Patient was admitted at 38+ weeks gestation for contractions.     Today patient is 38w2d  gestation. States she feels well and is ready for discharge.  Patient reports no new complaints. She reports good fetal movement today. Patient arrived complaining of regular contractions, now however they are palpating mild and infrequent.  She has made no cervical change since admission.    She will be discharged today to home in good condition.     Recommendations going forward include daily fetal kick counts, return to hospital if labor pattern increases in strength and frequency.       DISPOSITION:   Home     DISCHARGE CONDITION: Good/Stable     DISCHARGE MEDICATIONS:      Medication List      ASK your doctor about these medications    PRENATAL VITAMIN ORAL     ranitidine 150 MG tablet  Commonly known as:  ZANTAC     valACYclovir 500 MG tablet  Commonly known as:  VALTREX              DISCHARGE PLAN:   - Follow up with Dr Sebastian, in 1 week  - Take medication as prescribed   - Physical activity: As directed per provider  - Diet: Regular   - Medication: Please see MAR   - Warning signs discussed with patient about when to call the clinic/hospital   - All questions and concerns were answered for the patient prior to discharge.     Radha Sebastian MD     I saw the patient on the date of discharge   Total time spent for discharge on date of discharge: 20  minutes     Physician(s) in addition to primary physician who should receive a copy:   CC: Zahraa Baugh MD

## 2021-06-25 NOTE — PROGRESS NOTES
Progress Notes by Juanito Koehler DO at 3/20/2017  1:20 PM     Author: Juanito Koehler DO Service: -- Author Type: Physician    Filed: 3/21/2017 11:13 AM Encounter Date: 3/20/2017 Status: Signed    : Juanito Koehler DO (Physician)       Chief Complaint   Patient presents with   ? Nausea     body aches, pregnant. Exposure from step sister.         History of Present Illness: Nursing notes reviewed. Patient has felt feverish. She has felt ill since this morning. No breathing problems. With those she does not have throat discomfort, she has been near somebody that has been diagnosed with strep throat. She is currently pregnant. She is a little nauseated at time of exam, but does not think she needs treatment for this at exam. No reported vomiting.    Review of systems: See history of present illness, otherwise negative.     Current Outpatient Prescriptions   Medication Sig Dispense Refill   ? PRENATAL VIT W-CA,FE,FA,<1 MG, (PRENATAL VITAMIN ORAL) Take by mouth.     ? butalbital-acetaminophen-caff -40 mg cap TK ONE OR TWO CS PO Q 4 TO 6 H PRN  0   ? FOLIC ACID ORAL Take by mouth.     ? ibuprofen (ADVIL,MOTRIN) 200 MG tablet Take 200 mg by mouth every 6 (six) hours as needed for pain.     ? oseltamivir (TAMIFLU) 75 MG capsule Take 1 capsule (75 mg total) by mouth 2 (two) times a day for 5 days. 10 capsule 0   ? SUMAtriptan (IMITREX) 100 MG tablet Take 1 tablet (100 mg total) by mouth every 2 (two) hours as needed for migraine. 10 tablet 0   ? valACYclovir (VALTREX) 500 MG tablet   2     No current facility-administered medications for this visit.        Past Medical History:   Diagnosis Date   ? Depression with anxiety    ? Dislocation of hip 11/15/2007   ? Foot Sprain    ? Pregnancy     Approximately  - 2 's, 1 EAB, 5 SAB's   ? Spontaneous      x 5      Past Surgical History:   Procedure Laterality Date   ? AUGMENTATION MAMMAPLASTY Bilateral    ? HIP SURGERY Left 2007     Reconstruction (congenital hip)   ? HIP SURGERY Left June 2011    Removal of hardware from previous surgery   ? HIP SURGERY Left Age 1 or 1    For congenital hip      Social History     Social History   ? Marital status:      Spouse name: N/A   ? Number of children: 2   ? 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 Smoking status: Former Smoker     Quit date: 12/1/2015   ? Smokeless tobacco: Never Used   ? Alcohol use Yes      Comment: Rare social use   ? Drug use: No   ? Sexual activity: Yes     Partners: Male     Other Topics Concern   ? None     Social History Narrative    Daughter Rema (4/25/06), son Leonid Moore (10/7/10) .       History   Smoking Status   ? Former Smoker   ? Quit date: 12/1/2015   Smokeless Tobacco   ? Never Used      Exam:   Blood pressure 92/56, pulse 90, temperature 98.4  F (36.9  C), temperature source Oral, resp. rate 12, weight 166 lb 4.8 oz (75.4 kg), SpO2 98 %, not currently breastfeeding.    EXAM:   General: Vital signs reviewed. Patient is in no acute appearing distress. Breathing is non labored appearing. Patient is alert and oriented x 3.   ENT: Ear exam shows clear TMs with no injection, nasal turbinates are injected and edematous with increased clear rhinorrhea, no pharyngeal injection noted.  Neck: supple with no adenopathy  Heart: Normal rate and rhythm without murmur  Lungs: Clear to auscultation with good air flow bilaterally.  Skin: warm and dry  Recent Results (from the past 24 hour(s))   Rapid Strep A Screen-Throat   Result Value Ref Range    Rapid Strep A Antigen No Group A Strep detected No Group A Strep detected   Influenza A/B Rapid Test   Result Value Ref Range    Influenza  A, Rapid Antigen No Influenza A antigen detected No Influenza A antigen detected    Influenza B, Rapid Antigen No Influenza B antigen detected No Influenza B antigen detected    Results from exam reviewed with patient.   I  advised patient that given her symptoms, and the fact that she is pregnant, I recommended that she be treated for influenza illness despite the negative rapid influenza test. She is agreeable to doing so.  Assessment/Plan   1. Strep throat exposure  Rapid Strep A Screen-Throat    Group A Strep, RNA Direct Detection, Throat   2. Myalgia  Influenza A/B Rapid Test   3. Left ear impacted cerumen  Ear wax removal   4. Exposure to influenza  oseltamivir (TAMIFLU) 75 MG capsule   5. Influenza-like symptoms  oseltamivir (TAMIFLU) 75 MG capsule       Patient Instructions     We will notify you of the pending strep study, and treat if indicted. Be seen again in 3-4 days if symptoms are not better, sooner if feeling any worse. Start Tamiflu treatment today based on your exposure and symptoms.    Self-Care for Sore Throats  Sore throats occur for many reasons, such as colds, allergies, and infections caused by viruses or bacteria. In any case, your throat becomes red and sore. Your goal for self-care is to reduce your discomfort while giving your throat a chance to heal.    Moisten and Soothe Your Throat    Try a sip of water first thing after waking up.    Keep your throat moist by drinking 6 or more glasses of clear liquids every day.    Run a cool-air humidifier in your room overnight.    Avoid cigarette smoke.     Suck on throat lozenges, cough drops, hard candy, ice chips, or frozen fruit-juice bars. Use the sugar-free versions if your diet or medical condition require them.  Gargle to Ease Irritation  Gargling every hour or 2 can ease irritation. Try gargling with 1 of these solutions:    1/4 teaspoon of salt in 1/2 cup of warm water    An over-the-counter anesthetic gargle  Use Medication for More Relief  Over-the-counter medication can reduce sore throat symptoms. Ask your pharmacist if you have questions about which medication to use:    Ease pain with anesthetic sprays. Aspirin or an aspirin substitute also helps.  Remember, never give aspirin to anyone 18 or younger, or if you are already taking blood thinners.     For sore throats caused by allergies, try antihistamines to block the allergic reaction.    Remember: unless a sore throat is caused by a bacterial infection, antibiotics wont help you.  Prevent Future Sore Throats    Stop smoking or reduce contact with secondhand smoke. Smoke irritates the tender throat lining.    Limit contact with pets and with allergy-causing substances, such as pollen and mold.    When youre around someone with a sore throat or cold, wash your hands frequently to keep viruses or bacteria from spreading.    Dont strain your vocal cords.  Call Your Health Care Provider  Contact your doctor if you have:    A temperature over 101 F (38.3 C)    White spots on the throat    Great difficulty swallowing    Trouble breathing    A skin rash    Recent exposure to someone else with strep bacteria    Severe hoarseness and swollen glands in the neck or jaw     9082-7548 [employer]. [address] [place] [address]. All rights reserved. This information is not intended as a substitute for professional medical care. Always follow your healthcare professional's instructions.          Influenza  Influenza (the flu) is an infection that affects your respiratory tract (the mouth, nose, and lungs, and the passages between them). Unlike a cold, the flu can make you very ill. And it can lead to pneumonia, a serious lung infection. For some people, especially older adults, young children, and people with certain chronic conditions, the flu can have serious complications and even be fatal.  What Are the Risk Factors for the Flu?  Anyone can get the flu. But youre more likely to become infected if you:    Have a weakened immune system.    Work in a health care setting where you may be exposed to flu germs.    Live or work with someone who has the flu.    Havent received an annual flu shot.  How  Does the Flu Spread?  The flu is caused by viruses. The viruses spread through the air in droplets when someone who has the flu coughs, sneezes, laughs, or talks. You can become infected when you inhale these viruses directly. You can also become infected when you touch a surface on which the droplets have landed and then transfer the germs to your eyes, nose, or mouth. Touching used tissues, or sharing utensils, drinking glasses, or a toothbrush with an infected person can expose you to flu viruses, too.  What Are the Symptoms of the Flu?  Flu symptoms tend to come on quickly and may last a few days to a few weeks. They include:    Fever usually higher than 101 F  (38.3 C) and chills    Sore throat and headache    Dry cough    Runny nose    Tiredness and weakness    Muscle aches  Factors That Can Make Flu Worse  For some people, the flu can be very serious. The risk of complications is greater for:    Children under age 5.    Adults 65 years of age and older.    People with a chronic illness, such as diabetes or heart, kidney, or lung disease.    People who live in a nursing home or long-term care facility.   How Is the Flu Treated?  Influenza usually improves after 7 days or so. In some cases, your health care provider may prescribe an antiviral medication. This may help you get well sooner. For the medication to help, you need to take it as soon as possible (ideally within 48 hours) after your symptoms start. If you develop pneumonia or other serious illness, hospital care may be needed.  Easing Flu Symptoms    Drink lots of fluids such as water, juice, and warm soup. A good rule is to drink enough so that you urinate your normal amount.    Get plenty of rest.    Ask your health care provider what to take for fever and pain.    Call your provider if your fever rises over 101 F (38.3 C) or you become dizzy, lightheaded, or short of breath.  Taking Steps to Protect Others    Wash your hands often, especially after  coughing or sneezing. Or, clean your hands with an alcohol-based hand  containing at least 60 percent alcohol.    Cough or sneeze into a tissue. Then throw the tissue away and wash your hands. If you dont have a tissue, cough and sneeze into the crook of your elbow.    Stay home until at least 24 hours after you no longer have a fever or chills. Be sure the fever isnt being hidden by fever-reducing medication.    Dont share food, utensils, drinking glasses, or a toothbrush with others.    Ask your health care provider if others in your household should receive antiviral medication to help them avoid infection.  How Can the Flu Be Prevented?    One of the best ways to avoid the flu is to get a flu vaccination each year. Viruses that cause the flu change from year to year. For that reason, doctors recommend getting the flu vaccine each year, as soon as it's available in your area. The vaccine may be given as a shot or as a nasal spray. Your health care provider can tell you which vaccine is right for you.    Wash your hands often. Frequent handwashing is a proven way to help prevent infection.    Carry an alcohol-based hand gel containing at least 60 percent alcohol. Use it when you dont have access to soap and water. Then wash your hands as soon as you can.    Avoid touching your eyes, nose, and mouth.    At home and work, clean phones, computer keyboards, and toys often with disinfectant wipes.    If possible, avoid close contact with others who have the flu or symptoms of the flu.  Handwashing Tips  Handwashing is one of the best ways to prevent many common infections. If youre caring for or visiting someone with the flu, wash your hands each time you enter and leave the room. Follow these steps:    Use warm water and plenty of soap. Rub your hands together well.    Clean the whole hand, under your nails, between your fingers, and up the wrists.    Wash for at least 15 seconds.    Rinse, letting the water  run down your fingers, not up your wrists.    Dry your hands well. Use a paper towel to turn off the faucet and open the door.  Using Alcohol-Based Hand   Alcohol-based hand  are also a good choice. Use them when you dont have access to soap and water. Follow these steps:    Squeeze about a tablespoon of gel into the palm of one hand.    Rub your hands together briskly, cleaning the backs of your hands, the palms, between your fingers, and up the wrists.    Rub until the gel is gone and your hands are completely dry.  Preventing Influenza in Healthcare Settings  The flu is a special concern for people in hospitals and long-term care facilities. To help prevent the spread of flu, many hospitals and nursing homes take these steps:    Health care providers wash their hands or use an alcohol-based hand  before and after treating each patient.    People with the flu have private rooms and bathrooms or share a room with someone with the same infection.    High-risk patients who dont have the flu are encouraged to get the flu and pneumonia vaccines.    All health care workers are encouraged or required to get flu shots.        5783-5854 [employer]. [address] [place] [address], [place] [address]. All rights reserved. This information is not intended as a substitute for professional medical care. Always follow your healthcare professional's instructions.           Juanito Koehler,

## 2021-06-26 NOTE — PROGRESS NOTES
Progress Notes by Shiela Keita MD at 2/7/2018  2:50 PM     Author: Shiela Keita MD Service: -- Author Type: Physician    Filed: 2/7/2018  3:27 PM Encounter Date: 2/7/2018 Status: Signed    : Shiela Keita MD (Physician)           Click to link to [facility] HEART CARE NOTE    Thank you, Dr. Mohamud, for asking the [facility] Heart Care team to see Ms. Silva Guerrero to evaluate cardiac cause for abnormal chest CT.    Assessment/Recommendations   Assessment:    1.  Abnormal chest CT, this demonstrated evidence of bibasilar infiltrates and tiny bilateral pleural effusions possibly due to infectious cause, although given her complaints of chest discomfort, cardiac cause was suggested.  It is possible she may have developed a cardiomyopathy related to her recent viral illness.  Doubt myocarditis given normal troponin level.  Did recommend an echocardiogram to assess her LV function which she is agreeable to.    Plan:  1.  Schedule echocardiogram with further recommendations to follow       History of Present Illness    Ms. Silva Guerrero is a 27 y.o. female with unremarkable past medical history who has recently had symptoms of an upper respiratory tract infection.  She had seen her primary physician approximately 5 days ago and was told she likely had a viral illness.  She did have influenza testing which was negative.  Last night, she awakened to feed her child and noted pressure sensation in the anterior chest radiating into her back.  This prompted an evaluation in the ED where her ECG and troponin were unremarkable.  She did have a chest CT PE run performed demonstrating no evidence of pulmonary embolus but groundglass infiltrates in both bases of the lungs along with tiny pleural effusions.  She was told she might have pneumonia and was given antibiotic therapy but also advised to be seen in the rapid access clinic for possible myocarditis.  The patient continues to have  symptoms of mild chest and mid back pain.  She has had a recent cough which has been nonproductive or productive only of clear sputum.  She denies any previous symptoms of orthopnea, PND or lower extremity edema.    ECG (personally reviewed): ECG from the ER demonstrated normal sinus rhythm but was otherwise unremarkable    Cardiac Imaging Studies (personally reviewed): No cardiac imaging     Physical Examination Review of Systems   Vitals:    18 1446   BP: 90/70   Pulse: 76   Resp: 16     Body mass index is 29.08 kg/(m^2).  Wt Readings from Last 3 Encounters:   18 159 lb (72.1 kg)   18 158 lb (71.7 kg)   18 161 lb (73 kg)     General Appearance:   Awake, Alert, No acute distress.   HEENT:  No scleral icterus; the mucous membranes were pink and moist.   Neck: No cervical bruits or jugular venous distention    Chest: The spine was straight. The chest was symmetric.   Lungs:   Respirations unlabored; the lungs are clear to auscultation. No wheezing   Cardiovascular:    Regular rate and rhythm.  S1, S2 normal.  No murmur or gallop heard   Abdomen:  No organomegaly, masses, bruits, or tenderness. Bowels sounds are present   Extremities:  No peripheral edema bilaterally   Skin: No xanthelasma. Warm, Dry.   Musculoskeletal: No tenderness.   Neurologic: Mood and affect are appropriate.    General: WNL     Ears/Nose/Throat: WNL  Lungs: Cough, Shortness of Breath, Wheezing  Heart: Chest Pain, Arm Pain, Shortness of Breath with activity, Irregular Heartbeat  Stomach: WNL  Bladder: WNL  Muscle/Joints: WNL  Skin: WNL  Nervous System: Daytime Sleepiness, Dizziness  Mental Health: Depression, Anxiety     Blood: WNL     Medical History  Surgical History Family History Social History   Past Medical History:   Diagnosis Date   ? Depression with anxiety    ? Dislocation of hip 11/15/2007   ? Foot Sprain    ? Pregnancy     Approximately  - 2 's, 1 EAB, 5 SAB's   ? Spontaneous      x 5     Past Surgical History:   Procedure Laterality Date   ? AUGMENTATION MAMMAPLASTY Bilateral 2011   ? DILATION AND CURETTAGE OF UTERUS  02/01/2016   ? HIP SURGERY Left July 2007    Reconstruction (congenital hip)   ? HIP SURGERY Left June 2011    Removal of hardware from previous surgery   ? HIP SURGERY Left Age 1 or 1    For congenital hip    Family History   Problem Relation Age of Onset   ? Breast cancer Other      maternal great aunt   ? Cervical cancer Mother    ? Melanoma Father      on right ear   ? Diabetes type II Father    ? Diabetes type II Maternal Grandfather     Social History     Social History   ? Marital status:      Spouse name: N/A   ? Number of children: 2   ? 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 Smoking status: Former Smoker     Packs/day: 0.25     Quit date: 12/1/2015   ? Smokeless tobacco: Never Used   ? Alcohol use Yes      Comment: Rare social use; none with pregnancy   ? Drug use: No   ? Sexual activity: Yes     Partners: Male     Birth control/ protection: None     Other Topics Concern   ? Not on file     Social History Narrative    Daughter Rema (4/25/06), son Leonid Moore (10/7/10) .          Medications  Allergies   Current Outpatient Prescriptions   Medication Sig Dispense Refill   ? amoxicillin-clavulanate (AUGMENTIN XR) 1,000-62.5 mg per tablet Take 2 tablets by mouth 2 (two) times a day for 7 days. 28 tablet 0   ? PRENATAL VIT W-CA,FE,FA,<1 MG, (PRENATAL VITAMIN ORAL) Take by mouth.     ? valACYclovir (VALTREX) 500 MG tablet   2   ? amoxicillin (AMOXIL) 875 MG tablet Take 1 tablet (875 mg total) by mouth 2 (two) times a day for 10 days. 20 tablet 0   ? FOLIC ACID ORAL Take by mouth.       No current facility-administered medications for this visit.       Allergies   Allergen Reactions   ? Latex          Lab Results    Chemistry/lipid CBC Cardiac Enzymes/BNP/TSH/INR   Lab Results   Component Value Date     CREATININE 0.63 02/07/2018    BUN 13 02/07/2018    K 3.8 02/07/2018     02/07/2018     02/07/2018    CO2 22 02/07/2018    Lab Results   Component Value Date    WBC 9.2 02/07/2018    HGB 13.1 02/07/2018    HCT 38.4 02/07/2018    MCV 87 02/07/2018     02/07/2018    Lab Results   Component Value Date    TROPONINI <0.01 02/07/2018    TSH 1.0 06/13/2012

## 2021-06-27 ENCOUNTER — HEALTH MAINTENANCE LETTER (OUTPATIENT)
Age: 31
End: 2021-06-27

## 2021-07-14 PROBLEM — Z34.90 PREGNANT: Status: RESOLVED | Noted: 2017-06-28 | Resolved: 2017-06-29

## 2021-10-17 ENCOUNTER — HEALTH MAINTENANCE LETTER (OUTPATIENT)
Age: 31
End: 2021-10-17

## 2022-03-07 ENCOUNTER — TRANSFERRED RECORDS (OUTPATIENT)
Dept: HEALTH INFORMATION MANAGEMENT | Facility: CLINIC | Age: 32
End: 2022-03-07

## 2022-03-07 ENCOUNTER — MEDICAL CORRESPONDENCE (OUTPATIENT)
Dept: HEALTH INFORMATION MANAGEMENT | Facility: CLINIC | Age: 32
End: 2022-03-07

## 2022-03-08 ENCOUNTER — MEDICAL CORRESPONDENCE (OUTPATIENT)
Dept: HEALTH INFORMATION MANAGEMENT | Facility: CLINIC | Age: 32
End: 2022-03-08

## 2022-03-09 ENCOUNTER — TRANSCRIBE ORDERS (OUTPATIENT)
Dept: MATERNAL FETAL MEDICINE | Facility: HOSPITAL | Age: 32
End: 2022-03-09

## 2022-03-09 DIAGNOSIS — O26.90 PREGNANCY RELATED CONDITION: Primary | ICD-10-CM

## 2022-07-24 ENCOUNTER — HEALTH MAINTENANCE LETTER (OUTPATIENT)
Age: 32
End: 2022-07-24

## 2022-10-02 ENCOUNTER — HEALTH MAINTENANCE LETTER (OUTPATIENT)
Age: 32
End: 2022-10-02

## 2023-03-20 ENCOUNTER — LAB REQUISITION (OUTPATIENT)
Dept: LAB | Facility: CLINIC | Age: 33
End: 2023-03-20

## 2023-03-20 DIAGNOSIS — N92.6 IRREGULAR MENSTRUATION, UNSPECIFIED: ICD-10-CM

## 2023-03-20 DIAGNOSIS — Z13.1 ENCOUNTER FOR SCREENING FOR DIABETES MELLITUS: ICD-10-CM

## 2023-03-20 DIAGNOSIS — Z13.220 ENCOUNTER FOR SCREENING FOR LIPOID DISORDERS: ICD-10-CM

## 2023-03-20 LAB — HBA1C MFR BLD: 5.4 %

## 2023-03-20 PROCEDURE — 83036 HEMOGLOBIN GLYCOSYLATED A1C: CPT | Performed by: NURSE PRACTITIONER

## 2023-03-20 PROCEDURE — 84702 CHORIONIC GONADOTROPIN TEST: CPT | Performed by: NURSE PRACTITIONER

## 2023-03-20 PROCEDURE — 80061 LIPID PANEL: CPT | Performed by: NURSE PRACTITIONER

## 2023-03-21 LAB
CHOLEST SERPL-MCNC: 190 MG/DL
HCG INTACT+B SERPL-ACNC: <1 MIU/ML
HDLC SERPL-MCNC: 67 MG/DL
LDLC SERPL CALC-MCNC: 86 MG/DL
NONHDLC SERPL-MCNC: 123 MG/DL
TRIGL SERPL-MCNC: 187 MG/DL

## 2023-08-12 ENCOUNTER — HEALTH MAINTENANCE LETTER (OUTPATIENT)
Age: 33
End: 2023-08-12

## 2025-05-05 ENCOUNTER — LAB REQUISITION (OUTPATIENT)
Dept: LAB | Facility: CLINIC | Age: 35
End: 2025-05-05

## 2025-05-05 DIAGNOSIS — Z13.220 ENCOUNTER FOR SCREENING FOR LIPOID DISORDERS: ICD-10-CM

## 2025-05-05 DIAGNOSIS — Z01.411 ENCOUNTER FOR GYNECOLOGICAL EXAMINATION (GENERAL) (ROUTINE) WITH ABNORMAL FINDINGS: ICD-10-CM

## 2025-05-05 DIAGNOSIS — Z13.1 ENCOUNTER FOR SCREENING FOR DIABETES MELLITUS: ICD-10-CM

## 2025-05-05 DIAGNOSIS — E66.9 OBESITY, UNSPECIFIED: ICD-10-CM

## 2025-05-05 LAB
EST. AVERAGE GLUCOSE BLD GHB EST-MCNC: 117 MG/DL
HBA1C MFR BLD: 5.7 %

## 2025-05-05 PROCEDURE — 83036 HEMOGLOBIN GLYCOSYLATED A1C: CPT | Performed by: NURSE PRACTITIONER

## 2025-05-05 PROCEDURE — 82040 ASSAY OF SERUM ALBUMIN: CPT | Performed by: NURSE PRACTITIONER

## 2025-05-05 PROCEDURE — 80061 LIPID PANEL: CPT | Performed by: NURSE PRACTITIONER

## 2025-05-05 PROCEDURE — 83002 ASSAY OF GONADOTROPIN (LH): CPT | Performed by: NURSE PRACTITIONER

## 2025-05-05 PROCEDURE — 83001 ASSAY OF GONADOTROPIN (FSH): CPT | Performed by: NURSE PRACTITIONER

## 2025-05-06 LAB
ALBUMIN SERPL BCG-MCNC: 4.6 G/DL (ref 3.5–5.2)
ALP SERPL-CCNC: 79 U/L (ref 40–150)
ALT SERPL W P-5'-P-CCNC: 25 U/L (ref 0–50)
ANION GAP SERPL CALCULATED.3IONS-SCNC: 10 MMOL/L (ref 7–15)
AST SERPL W P-5'-P-CCNC: 24 U/L (ref 0–45)
BILIRUB SERPL-MCNC: 0.2 MG/DL
BUN SERPL-MCNC: 13.7 MG/DL (ref 6–20)
CALCIUM SERPL-MCNC: 9.4 MG/DL (ref 8.8–10.4)
CHLORIDE SERPL-SCNC: 105 MMOL/L (ref 98–107)
CHOLEST SERPL-MCNC: 175 MG/DL
CREAT SERPL-MCNC: 0.67 MG/DL (ref 0.51–0.95)
EGFRCR SERPLBLD CKD-EPI 2021: >90 ML/MIN/1.73M2
FASTING STATUS PATIENT QL REPORTED: NO
FASTING STATUS PATIENT QL REPORTED: NO
FSH SERPL IRP2-ACNC: 9.2 MIU/ML
GLUCOSE SERPL-MCNC: 99 MG/DL (ref 70–99)
HCO3 SERPL-SCNC: 23 MMOL/L (ref 22–29)
HDLC SERPL-MCNC: 49 MG/DL
LDLC SERPL CALC-MCNC: 92 MG/DL
LH SERPL-ACNC: 6.6 MIU/ML
NONHDLC SERPL-MCNC: 126 MG/DL
POTASSIUM SERPL-SCNC: 3.9 MMOL/L (ref 3.4–5.3)
PROT SERPL-MCNC: 7.6 G/DL (ref 6.4–8.3)
SODIUM SERPL-SCNC: 138 MMOL/L (ref 135–145)
TRIGL SERPL-MCNC: 170 MG/DL